# Patient Record
Sex: MALE | Race: WHITE | URBAN - METROPOLITAN AREA
[De-identification: names, ages, dates, MRNs, and addresses within clinical notes are randomized per-mention and may not be internally consistent; named-entity substitution may affect disease eponyms.]

---

## 2020-12-17 ENCOUNTER — HOSPITAL ENCOUNTER (OUTPATIENT)
Dept: PHYSICAL THERAPY | Age: 55
Discharge: HOME | End: 2020-12-17
Payer: COMMERCIAL

## 2020-12-17 PROCEDURE — 97110 THERAPEUTIC EXERCISES: CPT

## 2020-12-17 PROCEDURE — 97162 PT EVAL MOD COMPLEX 30 MIN: CPT

## 2020-12-17 PROCEDURE — 97530 THERAPEUTIC ACTIVITIES: CPT

## 2020-12-17 NOTE — PROGRESS NOTES
[facility]  [address], [address] [facility] [place]  Ph [phone]  Fx [phone]    Plan of Care/ Statement of Necessity for Physical Therapy Services    Patient name: Hamp Dance Start of Care: 2020   Referral source: Sammi Obando* : 1965    Medical Diagnosis: Spinal stenosis [M48.00]   Onset [id number]:   Treatment Diagnosis: spinal stenosis                                              ICD-10: M48.00   Prior Hospitalization: see medical history Provider#: [id number]   Medications: Verified on Patient summary List    Comorbidities: depression; bipolar disorder; tremor, migraines   Prior Level of Function: functionally independent, no AD, moderate lifestyle      The Plan of Care and following information is based on the information from the initial evaluation. Assessment/ key information: 49 yo male who [facility] to [facility] with c/o lumbar  pain. Patient is s/p spinal stenosis early in . Patient reports decreased pain since being laid off and not standing on his feet all day. Patient demonstrates decreased ROM, decreased strength, impaired posture, impaired gait mechancis, pain and decreased functional mobility tolerance.     Patient will continue to benefit from skilled PT services to modify and progress therapeutic interventions, address functional mobility deficits, address ROM deficits, address strength deficits, analyze and address soft tissue restrictions, analyze and cue movement patterns, analyze and modify body mechanics/ergonomics, assess and modify postural abnormalities, address imbalance/dizziness and instruct in home and community integration to attain remaining goals.     Evaluation Complexity History MEDIUM  Complexity : 1-2 comorbidities / personal factors will impact the outcome/ POC ; Examination MEDIUM Complexity : 3 Standardized tests and measures addressing body structure, function, activity limitation and / or participation in recreation  ;Presentation MEDIUM Complexity : Evolving with changing characteristics  ; Clinical Decision Making MEDIUM Complexity : FOTO score of 26-74 : FOTO score = an established functional score where 100 = no disability  Overall Complexity Rating: MEDIUM  Problem List: pain affecting function, decrease ROM, decrease strength, impaired gait/ balance, decrease ADL/ functional abilitiies, decrease activity tolerance and decrease flexibility/ joint mobility   Treatment Plan may include any combination of the following: Therapeutic exercise, Therapeutic activities, Neuromuscular re-education, Physical agent/modality, Gait/balance training, Manual therapy, Patient education, Self Care training, Functional mobility training and Home safety training  Patient / Family readiness to learn indicated by: asking questions  Persons(s) to be included in education: patient (P)  Barriers to Learning/Limitations: None  Measures taken if barriers to learning: N/A  Patient Goal (s): body repair  Patient Self Reported Health Status: good  Rehabilitation Potential: good    Short Term Goals: To be accomplished in 3 weeks:   Short Term Goals: To be accomplished in 3 weeks:  1. Patient will be independent and compliant with HEP to progress toward goals and restore functional mobility. Eval Status: issued at [facility]     2. Patient will improve FOTO score by 4 points to improve functional tolerance for exercise. Eval Status: FOTO 59     3. Patient will improve pain in lumbar region  to 5/10  to improve static standing with cooking tolerance and restore prior level of function. Eval Status: 10/10 at worst     4. Pt will have +4/5 core and bilateral  strength to return to goals of ambulation with community distances.   Eval Status: Strength    Left  (0-5) Right  (0-5) N/T   Hip Flexion (L1,2) -4/5 -4/5 []?    Knee Extension (L3,4) +4/5 +4/5 []?    Ankle Dorsiflexion (L4) 5/5 5/5 []?    Great Toe Extension (L5) 5/5 5/5 []?    Ankle Plantarflexion (S1) 5/5 5/5 []?    Knee Flexion (S1,2) -4/5 -4/5 []?    Abdominals 4/5 4/5 []?    Paraspinals 4/5 4/5 []?    Back Rotators     []?    Gluteus Mario -4/5 -4/5 []?    Hip Abduction -4/5 -4/5 []?          5. Pt will have pain free lumbar AROM to 100% aid in functional mechanics for ambulation/ADLs. Eval Status: Active Movements: []? N/A   []? Too acute   []? Other:  ROM % AROM Comments:pain, area   Forward flexion 40-60 100%     Extension 20-30 100%     SideBend right 20-30       SideBend left 20-30       Rotation right 5-10 75%     Rotation left 5-10 75%           6. Patient will demonstrate stable pelvic positioning during ambulation to reduce pain in lumbar region. Eval Status:long sit test (+)    Long Term Goals: To be accomplished in 6 weeks:  1. Patient will improve FOTO score by 8 points to improve functional tolerance for cooking activities. Eval Status: FOTO 59     2. Pt will have 5/5 core and bilateral LE strength to return to goals of .community ambulation  Eval Status:   Strength    Left  (0-5) Right  (0-5) N/T   Hip Flexion (L1,2) -4/5 -4/5 []?    Knee Extension (L3,4) +4/5 +4/5 []?    Ankle Dorsiflexion (L4) 5/5 5/5 []?    Great Toe Extension (L5) 5/5 5/5 []?    Ankle Plantarflexion (S1) 5/5 5/5 []?    Knee Flexion (S1,2) -4/5 -4/5 []?    Abdominals 4/5 4/5 []?    Paraspinals 4/5 4/5 []?    Back Rotators     []?    Gluteus Mario -4/5 -4/5 []?    Hip Abduction -4/5 -4/5 []?       3. Patient will improve pain in lumbar spine to 0/10 at worst to improve rolling in bed tolerance and restore prior level of function. Eval Status: 10/10 at worst    Frequency / Duration: Patient to be seen 2 times per week for 6 weeks.     Patient/ Caregiver education and instruction: Diagnosis, prognosis, self care, activity modification and exercises   [x]  Plan of care has been reviewed with PTA    Certification Period: N/A  Catherine Maza 12/17/2020 6:35 PM    ________________________________________________________________________    I certify that the above Therapy Services are being furnished while the patient is under my care. I agree with the treatment plan and certify that this therapy is necessary.     Physician's Signature:_____________________Date:____________TIME:________    [employer], Date and Time must be completed for valid certification **  Please sign and return to [facility] [place] [address] [address], [address]  Ph [phone]  Fx [phone]

## 2020-12-17 NOTE — PROGRESS NOTES
PT DAILY TREATMENT NOTE/ LUMBAR EVAL 10-18    Patient Name: Omar Hairston  Date:2020 1DOB: 1965  [x]  Patient  Verified  Payor: Guzman Mcdonough / Plan: [place] [employer] PPO / Product Type: PPO /    In time:2:47  Out time:3:34  Total Treatment Time (min): 52  Visit #: 1 of 12    Medicare/BCBS Only   Total Timed Codes (min):  20 1:1 Treatment Time:  17       Treatment Area: Spinal stenosis [M48.00]    SUBJECTIVE  Pain Level (0-10 scale): 2/10  [x]constant []intermittent [x]improving []worsening []no change since onset    Any medication changes, allergies to medications, adverse drug reactions, diagnosis change, or new procedure performed?: [x] No    [] Yes (see summary sheet for update)  Subjective functional status/changes:     PLOF: functionally independent, no AD, moderate lifestyle  Limitations to PLOF: increased with long periods of standing; difficulty with extended  Walking; reports decreased symptoms since being laid off at work in April; improvement over a 2 month period after being out of work  Mechanism of Injury: chronic issues since ; was having increasing pain in lumbar and experienced weakness in bilateral LE  Current symptoms/Complaints: 2/10 at best with rest; 10/10 at worst with walking/standing; pt reports they are able to sleep through the night secondary to pain  Previous Treatment/Compliance: no injection; MRI (+) spinal stenosis;   PMHx/Surgical Hx: depression; bipolar disorder; tremor, migraines  Work Hx: laid off currently  Living Situation: lives with wife; no steps  Pt Goals: body repair  Barriers: [x]pain []financial []time []transportation []other  Motivation: good  Substance use: []Alcohol []Tobacco []other:   Cognition: A & O x 3        OBJECTIVE      17 min []Eval                  []Re-Eval       10 min Therapeutic Exercise:  [] See flow sheet :   Rationale: increase ROM, increase strength and improve coordination to improve the patients ability to improve core stability    10 min Therapeutic Activity:  []  See flow sheet :   Rationale: increase strength, improve coordination, improve balance and increase proprioception  to improve the patients ability to understand disease process and recovery expectations             With   [x] TE   [x] TA   [] neuro   [] other: Patient Education: [x] Review HEP    [] Progressed/Changed HEP based on:   [] positioning   [] body mechanics   [] transfers   [] heat/ice application    [] other:        General Evaluation    Physical Therapy Evaluation - Lumbar Spine  :    OBJECTIVE  Posture:  Lateral Shift: [x] R    [] L     [x] +  [] -  Kyphosis: [x] Increased [] Decreased   []  WNL  Lordosis:  [x] Increased [] Decreased   [] WNL  Pelvic symmetry: [] WNL    [] Other:    Gait:  [] Normal     [] Abnormal:    Active Movements: [] N/A   [] Too acute   [] Other:  ROM % AROM Comments:pain, area   Forward flexion 40-60 100%    Extension 20-30 100%    SideBend right 20-30     SideBend left 20-30     Rotation right 5-10 75%    Rotation left 5-10 75%        Neuro Screen [] WNL  Myotome/Dermatome/Reflexes: patella right and left 3+  Comments:    Dural Mobility:  SLR Supine: [x] R    [x] L    [] +    [x] -  @ (degrees):   Slump Test: [] R    [] L    [] +    [] -  @ (degrees):   Prone Knee Bend: [] R    [] L    [] +    [] -     Palpation  [] Min  [x] Mod  [] Severe    Location:left  Thoracic and lumbar paraspinals tightness with right to left spinal curve   [] Min  [] Mod  [] Severe    Location:    Strength   Left  (0-5) Right  (0-5) N/T   Hip Flexion (L1,2) -4/5 -4/5 []   Knee Extension (L3,4) +4/5 +4/5 []   Ankle Dorsiflexion (L4) 5/5 5/5 []   Great Toe Extension (L5) 5/5 5/5 []   Ankle Plantarflexion (S1) 5/5 5/5 []   Knee Flexion (S1,2) -4/5 -4/5 []   Abdominals 4/5 4/5 []   Paraspinals 4/5 4/5 []   Back Rotators   []   Gluteus Mario -4/5 -4/5 []   Hip Abduction -4/5 -4/5 []         Special Tests  Lumbar:  Lumb.  Compression: [] Pos  [] Neg               Lumbar Distraction:   [] Pos  [x] Neg    Quadrant:  [] Pos  [] Neg   [] Flex  [] Ext    Sacroilliac:  Gaenslen's: [] R    [] L    [] +    [] -     Compression: [] +    [x] -     Gapping:  [] +    [x] -     Thigh Thrust: [] R    [] L    [] +    [] -            Hip: Barb Maclachlan:  [] R    [x] L    [x] +    [] -     Scour:  [x] R    [x] L    [] +    [x] -     Piriformis: [] R    [] L    [] +    [] -       Other Tests / Comments:   Long sit test (+)     Pain Level (0-10 scale) post treatment: 2/10    ASSESSMENT/Changes in Function: 47 yo male who [facility] to [facility] with c/o lumbar  pain. Patient is s/p spinal stenosis early in 2020. Patient reports decreased pain since being laid off and not standing on his feet all day. Patient demonstrates decreased ROM, decreased strength, impaired posture, impaired gait mechancis, pain and decreased functional mobility tolerance. Patient will continue to benefit from skilled PT services to modify and progress therapeutic interventions, address functional mobility deficits, address ROM deficits, address strength deficits, analyze and address soft tissue restrictions, analyze and cue movement patterns, analyze and modify body mechanics/ergonomics, assess and modify postural abnormalities, address imbalance/dizziness and instruct in home and community integration to attain remaining goals. [x]  See Plan of Care  []  See progress note/recertification  []  See Discharge Summary         Progress towards goals / Updated goals:  Short Term Goals: To be accomplished in 3 weeks:  1. Patient will be independent and compliant with HEP to progress toward goals and restore functional mobility. Eval Status: issued at [facility]    2. Patient will improve FOTO score by 4 points to improve functional tolerance for exercise. Eval Status: FOTO 59    3. Patient will improve pain in lumbar region  to 5/10  to improve static standing with cooking tolerance and restore prior level of function.   Eval Status: 10/10 at worst    4. Pt will have +4/5 core and bilateral  strength to return to goals of ambulation with community distances. Eval Status: Strength   Left  (0-5) Right  (0-5) N/T   Hip Flexion (L1,2) -4/5 -4/5 []   Knee Extension (L3,4) +4/5 +4/5 []   Ankle Dorsiflexion (L4) 5/5 5/5 []   Great Toe Extension (L5) 5/5 5/5 []   Ankle Plantarflexion (S1) 5/5 5/5 []   Knee Flexion (S1,2) -4/5 -4/5 []   Abdominals 4/5 4/5 []   Paraspinals 4/5 4/5 []   Back Rotators   []   Gluteus Mario -4/5 -4/5 []   Hip Abduction -4/5 -4/5 []       5. Pt will have pain free lumbar AROM to 100% aid in functional mechanics for ambulation/ADLs. Eval Status: Active Movements: [] N/A   [] Too acute   [] Other:  ROM % AROM Comments:pain, area   Forward flexion 40-60 100%    Extension 20-30 100%    SideBend right 20-30     SideBend left 20-30     Rotation right 5-10 75%    Rotation left 5-10 75%        6. Patient will demonstrate stable pelvic positioning during ambulation to reduce pain in lumbar region. Eval Status:long sit test (+)    Long Term Goals: To be accomplished in 6 weeks:  1. Patient will improve FOTO score by 8 points to improve functional tolerance for cooking activities. Eval Status: FOTO 59    2. Pt will have 5/5 core and bilateral LE strength to return to goals of .community ambulation  Eval Status:   Strength   Left  (0-5) Right  (0-5) N/T   Hip Flexion (L1,2) -4/5 -4/5 []   Knee Extension (L3,4) +4/5 +4/5 []   Ankle Dorsiflexion (L4) 5/5 5/5 []   Great Toe Extension (L5) 5/5 5/5 []   Ankle Plantarflexion (S1) 5/5 5/5 []   Knee Flexion (S1,2) -4/5 -4/5 []   Abdominals 4/5 4/5 []   Paraspinals 4/5 4/5 []   Back Rotators   []   Gluteus Mario -4/5 -4/5 []   Hip Abduction -4/5 -4/5 []     3. Patient will improve pain in lumbar spine to 0/10 at worst to improve rolling in bed tolerance and restore prior level of function.   Eval Status: 10/10 at worst    PLAN  [x]  Upgrade activities as tolerated     [x]  Continue plan of care  [x]  Update interventions per flow sheet       []  Discharge due to:_  []  Other:_      Sesar Victoria 12/17/2020  2:54 PM

## 2020-12-24 ENCOUNTER — HOSPITAL ENCOUNTER (OUTPATIENT)
Dept: PHYSICAL THERAPY | Age: 55
Discharge: HOME | End: 2020-12-24
Payer: COMMERCIAL

## 2020-12-24 PROCEDURE — 97530 THERAPEUTIC ACTIVITIES: CPT

## 2020-12-24 PROCEDURE — 97110 THERAPEUTIC EXERCISES: CPT

## 2020-12-24 PROCEDURE — 97140 MANUAL THERAPY 1/> REGIONS: CPT

## 2020-12-24 PROCEDURE — 97112 NEUROMUSCULAR REEDUCATION: CPT

## 2020-12-24 NOTE — PROGRESS NOTES
PT DAILY TREATMENT NOTE    Patient Name: Jackeline Fuentes  Date:2020  : 1965  [x]  Patient  Verified  Payor: Darrius Estevez / Plan: [place] [employer] PPO / Product Type: PPO /    In time:8:45  Out time:9:30  Total Treatment Time (min): 45  Total Timed Codes (min): 40  1:1 Treatment Time ( only): N/A   Visit #: 2 of 12    Treatment Area: Spinal stenosis [M48.00]    SUBJECTIVE  Pain Level (0-10 scale): 2/10  Any medication changes, allergies to medications, adverse drug reactions, diagnosis change, or new procedure performed?: [x] No    [] Yes (see summary sheet for update)  Subjective functional status/changes:   [] No changes reported  Reports that he had a mild sharp pain yesterday in hi tailbone region; reports is was just temporary;     OBJECTIVE    20 min Therapeutic Exercise:  []? See flow sheet :   Rationale: increase ROM, increase strength and improve coordination to improve the patients ability to improve core stability     15 min Therapeutic Activity:  []? See flow sheet :   Rationale: increase strength, improve coordination, improve balance and increase proprioception  to improve the patients ability to understand disease process and recovery expectations      10 min Manual Therapy:  Muscle energy correction   Rationale: decrease pain, increase ROM, increase tissue extensibility, correct positional vertigo, decrease trigger points and increase postural awareness to improve the patients ability to improve muscle balance The manual therapy interventions were performed at a separate and distinct time from the therapeutic activities interventions.           With   [x] TE   [x] TA   [] neuro   [] other: Patient Education: [x] Review HEP    [] Progressed/Changed HEP based on:   [] positioning   [] body mechanics   [] transfers   [] heat/ice application    [] other:      Other Objective/Functional Measures: long sit test (+)     Pain Level (0-10 scale) post treatment: 1/10    ASSESSMENT/Changes in Function: Patient has unstable pelvic positioning; no pain; reviewed self correction; no issues with progression of exercises. Patient will continue to benefit from skilled PT services to modify and progress therapeutic interventions, address functional mobility deficits, address ROM deficits, address strength deficits, analyze and address soft tissue restrictions, analyze and cue movement patterns, analyze and modify body mechanics/ergonomics, assess and modify postural abnormalities, address imbalance/dizziness and instruct in home and community integration to attain remaining goals. [x]  See Plan of Care  []  See progress note/recertification  []  See Discharge Summary         Progress towards goals / Updated goals:  Short Term Goals: To be accomplished in 3 weeks:  1. Patient will be independent and compliant with HEP to progress toward goals and restore functional mobility. Eval Status: issued at eval  Current:Reviewed HEP 12/24/2020     2. Patient will improve FOTO score by 4 points to improve functional tolerance for exercise. Eval Status: FOTO 59     3. Patient will improve pain in lumbar region  to 5/10  to improve static standing with cooking tolerance and restore prior level of function. Eval Status: 10/10 at worst  Current: 2/10 12/24/2020     4. Pt will have +4/5 core and bilateral  strength to return to goals of ambulation with community distances. Eval Status: Strength    Left  (0-5) Right  (0-5) N/T   Hip Flexion (L1,2) -4/5 -4/5 []?    Knee Extension (L3,4) +4/5 +4/5 []?    Ankle Dorsiflexion (L4) 5/5 5/5 []?    Great Toe Extension (L5) 5/5 5/5 []?    Ankle Plantarflexion (S1) 5/5 5/5 []?    Knee Flexion (S1,2) -4/5 -4/5 []?    Abdominals 4/5 4/5 []?    Paraspinals 4/5 4/5 []?    Back Rotators     []?    Gluteus Mario -4/5 -4/5 []?    Hip Abduction -4/5 -4/5 []?          5. Pt will have pain free lumbar AROM to 100% aid in functional mechanics for ambulation/ADLs.   Eval Status: Active Movements: []? N/A   []? Too acute   []? Other:  ROM % AROM Comments:pain, area   Forward flexion 40-60 100%     Extension 20-30 100%     SideBend right 20-30       SideBend left 20-30       Rotation right 5-10 75%     Rotation left 5-10 75%           6. Patient will demonstrate stable pelvic positioning during ambulation to reduce pain in lumbar region. Eval Status:long sit test (+)  Current: 12/24/2020 long sit test (+)    Long Term Goals: To be accomplished in 6 weeks:  1. Patient will improve FOTO score by 8 points to improve functional tolerance for cooking activities. Eval Status: FOTO 59     2. Pt will have 5/5 core and bilateral LE strength to return to goals of .community ambulation  Eval Status:   Strength    Left  (0-5) Right  (0-5) N/T   Hip Flexion (L1,2) -4/5 -4/5 []?    Knee Extension (L3,4) +4/5 +4/5 []?    Ankle Dorsiflexion (L4) 5/5 5/5 []?    Great Toe Extension (L5) 5/5 5/5 []?    Ankle Plantarflexion (S1) 5/5 5/5 []?    Knee Flexion (S1,2) -4/5 -4/5 []?    Abdominals 4/5 4/5 []?    Paraspinals 4/5 4/5 []?    Back Rotators     []?    Gluteus Mario -4/5 -4/5 []?    Hip Abduction -4/5 -4/5 []?       3. Patient will improve pain in lumbar spine to 0/10 at worst to improve rolling in bed tolerance and restore prior level of function. Eval Status: 10/10 at worst    PLAN  [x]  Upgrade activities as tolerated     [x]  Continue plan of care  []  Update interventions per flow sheet       []  Discharge due to:_  []  Other:_      Brandon Armas 12/24/2020  9:03 AM    No future appointments.

## 2021-01-06 ENCOUNTER — HOSPITAL ENCOUNTER (OUTPATIENT)
Dept: PHYSICAL THERAPY | Age: 56
Discharge: HOME | End: 2021-01-06
Payer: COMMERCIAL

## 2021-01-06 PROCEDURE — 97110 THERAPEUTIC EXERCISES: CPT

## 2021-01-06 PROCEDURE — 97530 THERAPEUTIC ACTIVITIES: CPT

## 2021-01-06 NOTE — PROGRESS NOTES
PT DAILY TREATMENT NOTE    Patient Name: Lizandro Diaz  Date:2021  : 1965  [x]  Patient  Verified  Payor: Charlotte Wright / Plan: Michaela Pruitt / Product Type: HMO /    In time:11:46  Out time:12:33  Total Treatment Time (min): 47  Total Timed Codes (min): 47  1:1 Treatment Time ([facility] only): NA   Visit #: 3 of 12    Treatment Area: Spinal stenosis [M48.00]    SUBJECTIVE  Pain Level (0-10 scale): 0/10  Any medication changes, allergies to medications, adverse drug reactions, diagnosis change, or new procedure performed?: [x] No    [] Yes (see summary sheet for update)  Subjective functional status/changes:   [] No changes reported  \"I don't have any pain right now. \"    OBJECTIVE      37 min Therapeutic Exercise:  [x] See flow sheet :   Rationale: increase ROM, increase strength and improve coordination to improve the patients ability to return to prior level of physical activity. 10 min Therapeutic Activity:  [x]  See flow sheet :   Rationale: increase ROM, increase strength and improve coordination  to improve the patients ability to return to prior level of physical activity. With   [] TE   [] TA   [] neuro   [] other: Patient Education: [x] Review HEP    [] Progressed/Changed HEP based on:   [] positioning   [] body mechanics   [] transfers   [] heat/ice application    [] other:      Other Objective/Functional Measures:      Pain Level (0-10 scale) post treatment: 0/10    ASSESSMENT/Changes in Function: Pt tolerated session well with no increased pain. Pt did demonstrate signs of fatigue and deconditioning in multiple muscle groups. Pt reported no pain or adverse affects post tx.      Patient will continue to benefit from skilled PT services to modify and progress therapeutic interventions, address functional mobility deficits, address ROM deficits, address strength deficits, analyze and address soft tissue restrictions, analyze and cue movement patterns, analyze and modify body mechanics/ergonomics and assess and modify postural abnormalities to attain remaining goals. []  See Plan of Care  []  See progress note/recertification  []  See Discharge Summary         Progress towards goals / Updated goals:  Short Term Goals: To be accomplished in 3 weeks:  1. Patient will be independent and compliant with HEP to progress toward goals and restore functional mobility. Eval Status: issued at eval  Current: Pt given resistance band for HEP 1/6/21     2. Patient will improve FOTO score by 4 points to improve functional tolerance for exercise. Eval Status: FOTO 59     3. Patient will improve pain in lumbar region  to 5/10  to improve static standing with cooking tolerance and restore prior level of function. Eval Status: 10/10 at worst  Current: 3/10 at worst 1/6/21     4. Pt will have +4/5 core and bilateral  strength to return to goals of ambulation with community distances. Eval Status: Strength    Left  (0-5) Right  (0-5) N/T   Hip Flexion (L1,2) -4/5 -4/5 []??    Knee Extension (L3,4) +4/5 +4/5 []??    Ankle Dorsiflexion (L4) 5/5 5/5 []??    Great Toe Extension (L5) 5/5 5/5 []??    Ankle Plantarflexion (S1) 5/5 5/5 []??    Knee Flexion (S1,2) -4/5 -4/5 []??    Abdominals 4/5 4/5 []??    Paraspinals 4/5 4/5 []??    Back Rotators     []? ?    Gluteus Mario -4/5 -4/5 []??    Hip Abduction -4/5 -4/5 []??     Current: no change since eval 1/6/21     5. Pt will have pain free lumbar AROM to 100% aid in functional mechanics for ambulation/ADLs. Eval Status: Active Movements: []?? N/A   []? ? Too acute   []? ? Other:  ROM % AROM Comments:pain, area   Forward flexion 40-60 100%     Extension 20-30 100%     SideBend right 20-30       SideBend left 20-30       Rotation right 5-10 75%     Rotation left 5-10 75%      Current: No change since eval 1/6/21     6. Patient will demonstrate stable pelvic positioning during ambulation to reduce pain in lumbar region.   Eval Status:long sit test (+)  Current: 12/24/2020 long sit test (+)     Long Term Goals: To be accomplished in 6 weeks:  1. Patient will improve FOTO score by 8 points to improve functional tolerance for cooking activities. Eval Status: FOTO 59     2.   Pt will have 5/5 core and bilateral LE strength to return to goals of .community ambulation  Eval Status:   Strength    Left  (0-5) Right  (0-5) N/T   Hip Flexion (L1,2) -4/5 -4/5 []??    Knee Extension (L3,4) +4/5 +4/5 []??    Ankle Dorsiflexion (L4) 5/5 5/5 []??    Great Toe Extension (L5) 5/5 5/5 []??    Ankle Plantarflexion (S1) 5/5 5/5 []??    Knee Flexion (S1,2) -4/5 -4/5 []??    Abdominals 4/5 4/5 []??    Paraspinals 4/5 4/5 []??    Back Rotators     []? ?    Gluteus Mario -4/5 -4/5 []??    Hip Abduction -4/5 -4/5 []??     Current: No change since eval 1/6/21  3.   Patient will improve pain in lumbar spine to 0/10 at worst to improve rolling in bed tolerance and restore prior level of function.   Eval Status: 10/10 at worst  Current: 3/10 at worst in past week 1/6/21      PLAN  [x]  Upgrade activities as tolerated     [x]  Continue plan of care  []  Update interventions per flow sheet       []  Discharge due to:_  []  Other:_      Leslie Alves PTA 1/6/2021  11:50 AM    Future Appointments   Date Time Provider Kristie Aleman   1/8/2021 11:00 AM [facility]   1/13/2021 11:45 AM Kaylin Giang PTA [facility]   1/15/2021 11:00 AM [facility]   1/20/2021 12:30 PM [facility]   1/22/2021 12:30 PM [facility]   1/27/2021 11:45 AM [facility]   1/29/2021 12:30 PM Sabra Miller [facility]

## 2021-01-06 NOTE — PROGRESS NOTES
[facility] at [address] [place], [address]  Ph [phone]  Fx [phone]    Physical Therapy Progress Note  Patient name: Sumaya Saba Start of Care: 2020   Referral source: Mariela Jordan* : 1965   Medical/Treatment Diagnosis: Spinal stenosis [M48.00] Onset Date:2019:   Prior Hospitalization: see medical history Provider#: [id number]   Medications: Verified on Patient Summary List    Comorbidities: depression; bipolar disorder; tremor, migraines  Prior Level of Function: functionally independent, no AD, moderate lifestyle    Visits from Start of Care: 3    Missed Visits: 0    Goals/Measure of Progress:    Patient will continue to benefit from skilled PT services to modify and progress therapeutic interventions, address functional mobility deficits, address ROM deficits, address strength deficits, analyze and address soft tissue restrictions, analyze and cue movement patterns, analyze and modify body mechanics/ergonomics and assess and modify postural abnormalities to attain remaining goals. Short Term Goals: To be accomplished in 3 weeks:  1. Patient will be independent and compliant with HEP to progress toward goals and restore functional mobility. Eval Status: issued at eval  Current: Pt given resistance band for HEP 21     2. Patient will improve FOTO score by 4 points to improve functional tolerance for exercise. Eval Status: FOTO 59     3. Patient will improve pain in lumbar region  to 5/10  to improve static standing with cooking tolerance and restore prior level of function. Eval Status: 10/10 at worst  Current: 3/10 at worst 21     4. Pt will have +4/5 core and bilateral  strength to return to goals of ambulation with community distances.   Eval Status: Strength    Left  (0-5) Right  (0-5) N/T   Hip Flexion (L1,2) -4/5 -4/5 []???    Knee Extension (L3,4) +4/5 +4/5 []???    Ankle Dorsiflexion (L4) 5/5 5/5 []???    Great Toe Extension (L5) 5/5 5/5 []???    Ankle Plantarflexion (S1) 5/5 5/5 []???    Knee Flexion (S1,2) -4/5 -4/5 []???    Abdominals 4/5 4/5 []???    Paraspinals 4/5 4/5 []???    Back Rotators     []? ??    Gluteus Mario -4/5 -4/5 []???    Hip Abduction -4/5 -4/5 []???     Current: no change since eval 1/6/21     5. Pt will have pain free lumbar AROM to 100% aid in functional mechanics for ambulation/ADLs. Eval Status: Active Movements: []??? N/A   []? ?? Too acute   []? ?? Other:  ROM % AROM Comments:pain, area   Forward flexion 40-60 100%     Extension 20-30 100%     SideBend right 20-30       SideBend left 20-30       Rotation right 5-10 75%     Rotation left 5-10 75%      Current: No change since eval 1/6/21     6. Patient will demonstrate stable pelvic positioning during ambulation to reduce pain in lumbar region. Eval Status:long sit test (+)  Current: 12/24/2020 long sit test (+)     Long Term Goals: To be accomplished in 6 weeks:  1. Patient will improve FOTO score by 8 points to improve functional tolerance for cooking activities. Eval Status: FOTO 59     2.   Pt will have 5/5 core and bilateral LE strength to return to goals of .community ambulation  Eval Status:   Strength    Left  (0-5) Right  (0-5) N/T   Hip Flexion (L1,2) -4/5 -4/5 []???    Knee Extension (L3,4) +4/5 +4/5 []???    Ankle Dorsiflexion (L4) 5/5 5/5 []???    Great Toe Extension (L5) 5/5 5/5 []???    Ankle Plantarflexion (S1) 5/5 5/5 []???    Knee Flexion (S1,2) -4/5 -4/5 []???    Abdominals 4/5 4/5 []???    Paraspinals 4/5 4/5 []???    Back Rotators     []? ??    Gluteus Mario -4/5 -4/5 []???    Hip Abduction -4/5 -4/5 []???     Current: No change since eval 1/6/21  3.   Patient will improve pain in lumbar spine to 0/10 at worst to improve rolling in bed tolerance and restore prior level of function.   Eval Status: 10/10 at worst  Current: 3/10 at worst in past week 1/6/21       Key Functional Changes: no significant changes since initial evaluation  Updated Goals:  To be achieved in 4weeks:    ASSESSMENT/RECOMMENDATIONS:  [x]Continue therapy per initial plan/protocol at a frequency of  2 x per week for 4 weeks  []Continue therapy with the following recommended changes:_____________________ _____________________________ ________________________________________  []Discontinue therapy progressing towards or have reached established goals  []Discontinue therapy due to lack of appreciable progress towards goals  []Discontinue therapy due to lack of attendance or compliance  []Await Physician's recommendations/decisions regarding therapy  []Other:________________________________________________________________    Thank you for this referral.   Yessica Verdugo 1/6/2021 2:53 PM

## 2021-01-08 ENCOUNTER — HOSPITAL ENCOUNTER (OUTPATIENT)
Dept: PHYSICAL THERAPY | Age: 56
Discharge: HOME | End: 2021-01-08
Payer: COMMERCIAL

## 2021-01-08 PROCEDURE — 97530 THERAPEUTIC ACTIVITIES: CPT

## 2021-01-08 PROCEDURE — 97110 THERAPEUTIC EXERCISES: CPT

## 2021-01-08 NOTE — PROGRESS NOTES
PT DAILY TREATMENT NOTE    Patient Name: Lizandro Diaz  Date:2021  : 1965  [x]  Patient  Verified  Payor: Charlotte Wright / Plan: Michaela Pruitt / Product Type: HMO /    In time:11:00  Out time:11:53  Total Treatment Time (min): 53  Total Timed Codes (min): 53  1:1 Treatment Time ( only): 48   Visit #: 4 of 12    Treatment Area: Spinal stenosis [M48.00]    SUBJECTIVE  Pain Level (0-10 scale): 0/10  Any medication changes, allergies to medications, adverse drug reactions, diagnosis change, or new procedure performed?: [x] No    [] Yes (see summary sheet for update)  Subjective functional status/changes:   [] No changes reported  \"I don't have any pain right now. \"    OBJECTIVE      38 min Therapeutic Exercise:  [x] See flow sheet :   Rationale: increase ROM, increase strength and improve coordination to improve the patients ability to return to prior level of physical activity. 15 min Therapeutic Activity:  [x]  See flow sheet :   Rationale: increase ROM, increase strength and improve coordination  to improve the patients ability to return to prior level of physical activity. With   [] TE   [] TA   [] neuro   [] other: Patient Education: [x] Review HEP    [] Progressed/Changed HEP based on:   [] positioning   [] body mechanics   [] transfers   [] heat/ice application    [] other:      Other Objective/Functional Measures:      Pain Level (0-10 scale) post treatment: 0/10    ASSESSMENT/Changes in Function: Pt tolerated session well. Pt continues to demonstrate very evident weakness in glutes and general deconditioning of muscles in LE. Pt reported no pain with ex but reported increased fatigue post tx. PTA reviewed HEP with pt to ensure pt ability to comply with recommended compliance.      Patient will continue to benefit from skilled PT services to modify and progress therapeutic interventions, address functional mobility deficits, address ROM deficits, address strength deficits, analyze and address soft tissue restrictions, analyze and cue movement patterns, analyze and modify body mechanics/ergonomics and assess and modify postural abnormalities to attain remaining goals. []  See Plan of Care  []  See progress note/recertification  []  See Discharge Summary         Progress towards goals / Updated goals:  Short Term Goals: To be accomplished in 3 weeks:  1. Patient will be independent and compliant with HEP to progress toward goals and restore functional mobility. Eval Status: issued at eval  Current: Pt given resistance band for HEP 1/6/21     2. Patient will improve FOTO score by 4 points to improve functional tolerance for exercise. Eval Status: FOTO 59     3. Patient will improve pain in lumbar region  to 5/10  to improve static standing with cooking tolerance and restore prior level of function. Eval Status: 10/10 at worst  Current: 3/10 at worst 1/6/21     4. Pt will have +4/5 core and bilateral  strength to return to goals of ambulation with community distances. Eval Status: Strength    Left  (0-5) Right  (0-5) N/T   Hip Flexion (L1,2) -4/5 -4/5 []????    Knee Extension (L3,4) +4/5 +4/5 []????    Ankle Dorsiflexion (L4) 5/5 5/5 []????    Great Toe Extension (L5) 5/5 5/5 []????    Ankle Plantarflexion (S1) 5/5 5/5 []????    Knee Flexion (S1,2) -4/5 -4/5 []????    Abdominals 4/5 4/5 []????    Paraspinals 4/5 4/5 []????    Back Rotators     []????    Gluteus Mario -4/5 -4/5 []????    Hip Abduction -4/5 -4/5 []????     Current: no change since eval 1/6/21     5. Pt will have pain free lumbar AROM to 100% aid in functional mechanics for ambulation/ADLs. Eval Status: Active Movements: []???? N/A   []???? Too acute   []???? Other:  ROM % AROM Comments:pain, area   Forward flexion 40-60 100%     Extension 20-30 100%     SideBend right 20-30       SideBend left 20-30       Rotation right 5-10 75%     Rotation left 5-10 75%      Current: No change since eval 1/6/21     6. Patient will demonstrate stable pelvic positioning during ambulation to reduce pain in lumbar region. Eval Status:long sit test (+)  Current: 12/24/2020 long sit test (+)     Long Term Goals: To be accomplished in 6 weeks:  1. Patient will improve FOTO score by 8 points to improve functional tolerance for cooking activities. Eval Status: FOTO 59     2.   Pt will have 5/5 core and bilateral LE strength to return to goals of .community ambulation  Eval Status:   Strength    Left  (0-5) Right  (0-5) N/T   Hip Flexion (L1,2) -4/5 -4/5 []????    Knee Extension (L3,4) +4/5 +4/5 []????    Ankle Dorsiflexion (L4) 5/5 5/5 []????    Great Toe Extension (L5) 5/5 5/5 []????    Ankle Plantarflexion (S1) 5/5 5/5 []????    Knee Flexion (S1,2) -4/5 -4/5 []????    Abdominals 4/5 4/5 []????    Paraspinals 4/5 4/5 []????    Back Rotators     []????    Gluteus Mario -4/5 -4/5 []????    Hip Abduction -4/5 -4/5 []????     Current: No change since eval 1/6/21  3.   Patient will improve pain in lumbar spine to 0/10 at worst to improve rolling in bed tolerance and restore prior level of function.   Eval Status: 10/10 at worst  Current: 3/10 at worst in past week 1/6/21      PLAN  [x]  Upgrade activities as tolerated     [x]  Continue plan of care  []  Update interventions per flow sheet       []  Discharge due to:_  []  Other:_      Christine Banegas PTA 1/8/2021  11:08 AM    Future Appointments   Date Time Provider Kristie Aleman   1/13/2021 11:45 AM [facility]   1/15/2021 11:00 AM [facility]   1/20/2021 12:30 PM [facility]   1/22/2021 12:30 PM [facility]   1/27/2021 11:45 AM [facility]   1/29/2021 12:30 PM [facility]

## 2021-01-13 ENCOUNTER — APPOINTMENT (OUTPATIENT)
Dept: PHYSICAL THERAPY | Age: 56
End: 2021-01-13
Payer: COMMERCIAL

## 2021-01-15 ENCOUNTER — HOSPITAL ENCOUNTER (OUTPATIENT)
Dept: PHYSICAL THERAPY | Age: 56
Discharge: HOME | End: 2021-01-15
Payer: COMMERCIAL

## 2021-01-15 PROCEDURE — 97110 THERAPEUTIC EXERCISES: CPT

## 2021-01-15 PROCEDURE — 97530 THERAPEUTIC ACTIVITIES: CPT

## 2021-01-15 NOTE — PROGRESS NOTES
PT DAILY TREATMENT NOTE    Patient Name: Sal Rene  Date:1/15/2021  : 1965  [x]  Patient  Verified  Payor: Sinai Saunders / Plan: August Blackwood / Product Type: HMO /    In time: 11:00  Out time:11:55  Total Treatment Time (min): 55  Total Timed Codes (min): 55  1:1 Treatment Time ([facility] only): 54   Visit #: 5 of 12    Treatment Area: Spinal stenosis [M48.00]    SUBJECTIVE  Pain Level (0-10 scale): 0/10  Any medication changes, allergies to medications, adverse drug reactions, diagnosis change, or new procedure performed?: [x] No    [] Yes (see summary sheet for update)  Subjective functional status/changes:   [] No changes reported  \"I don't have any pain right now. I haven't been doing my exercises. \"    OBJECTIVE    45 min Therapeutic Exercise:  [x] See flow sheet :   Rationale: increase ROM, increase strength and improve coordination to improve the patients ability to return to prior level of physical activity. 10 min Therapeutic Activity:  [x]  See flow sheet :   Rationale: increase ROM, increase strength and improve coordination  to improve the patients ability to return to prior level of physical activity. With   [] TE   [] TA   [] neuro   [] other: Patient Education: [x] Review HEP    [] Progressed/Changed HEP based on:   [] positioning   [] body mechanics   [] transfers   [] heat/ice application    [] other:      Other Objective/Functional Measures:      Pain Level (0-10 scale) post treatment: 0/10    ASSESSMENT/Changes in Function: Pt tolerated session well. Pt reports lack of HEP participation due to moderate depression and general lack of motivation. PTA provided palliative conversation and then education. Pt reported having no pain post tx.      Patient will continue to benefit from skilled PT services to modify and progress therapeutic interventions, address functional mobility deficits, address ROM deficits, address strength deficits, analyze and address soft tissue restrictions, analyze and cue movement patterns, analyze and modify body mechanics/ergonomics and assess and modify postural abnormalities to attain remaining goals. []  See Plan of Care  []  See progress note/recertification  []  See Discharge Summary         Progress towards goals / Updated goals:  Short Term Goals: To be accomplished in 3 weeks:  1. Patient will be independent and compliant with HEP to progress toward goals and restore functional mobility. Eval Status: issued at eval  Current: Pt reports 1x/day compliance 1/15/21     2. Patient will improve FOTO score by 4 points to improve functional tolerance for exercise. Eval Status: FOTO 59     3. Patient will improve pain in lumbar region  to 5/10  to improve static standing with cooking tolerance and restore prior level of function. Eval Status: 10/10 at worst  Current: 3/10 at worst 1/6/21     4. Pt will have +4/5 core and bilateral  strength to return to goals of ambulation with community distances. Eval Status: Strength    Left  (0-5) Right  (0-5) N/T   Hip Flexion (L1,2) -4/5 -4/5 []?????    Knee Extension (L3,4) +4/5 +4/5 []?????    Ankle Dorsiflexion (L4) 5/5 5/5 []?????    Great Toe Extension (L5) 5/5 5/5 []?????    Ankle Plantarflexion (S1) 5/5 5/5 []?????    Knee Flexion (S1,2) -4/5 -4/5 []?????    Abdominals 4/5 4/5 []?????    Paraspinals 4/5 4/5 []?????    Back Rotators     []?????    Gluteus Mario -4/5 -4/5 []?????    Hip Abduction -4/5 -4/5 []?????     Current: no change since eval 1/6/21     5. Pt will have pain free lumbar AROM to 100% aid in functional mechanics for ambulation/ADLs. Eval Status: Active Movements: []????? N/A   []????? Too acute   []????? Other:  ROM % AROM Comments:pain, area   Forward flexion 40-60 100%     Extension 20-30 100%     SideBend right 20-30       SideBend left 20-30       Rotation right 5-10 75%     Rotation left 5-10 75%      Current: No change since eval 1/6/21     6. Patient will demonstrate stable pelvic positioning during ambulation to reduce pain in lumbar region.  Eval Status:long sit test (+)  Current: 12/24/2020 long sit test (+)     Long Term Goals: To be accomplished in 6 weeks:  1. Patient will improve FOTO score by 8 points to improve functional tolerance for cooking activities.  Eval Status: FOTO 59     2.   Pt will have 5/5 core and bilateral LE strength to return to goals of .community ambulation  Eval Status:   Strength    Left  (0-5) Right  (0-5) N/T   Hip Flexion (L1,2) -4/5 -4/5 []?????    Knee Extension (L3,4) +4/5 +4/5 []?????    Ankle Dorsiflexion (L4) 5/5 5/5 []?????    Great Toe Extension (L5) 5/5 5/5 []?????    Ankle Plantarflexion (S1) 5/5 5/5 []?????    Knee Flexion (S1,2) -4/5 -4/5 []?????    Abdominals 4/5 4/5 []?????    Paraspinals 4/5 4/5 []?????    Back Rotators     []?????    Gluteus Mario -4/5 -4/5 []?????    Hip Abduction -4/5 -4/5 []?????     Current: No change since eval 1/6/21  3.   Patient will improve pain in lumbar spine to 0/10 at worst to improve rolling in bed tolerance and restore prior level of function.  Eval Status: 10/10 at worst  Current: 3/10 at worst in past week 1/6/21         PLAN  [x]  Upgrade activities as tolerated     [x]  Continue plan of care  []  Update interventions per flow sheet       []  Discharge due to:_  []  Other:_      Grey Agee PTA 1/15/2021  11:01 AM    Future Appointments   Date Time Provider Department Center   1/20/2021 12:30 PM Grey Agee PTA [facility]   1/22/2021 12:30 PM Grey Agee PTA [facility]   1/27/2021 11:45 AM Grey Agee PTA [facility]   1/29/2021 12:30 PM Grey Agee PTA [facility]

## 2021-01-20 ENCOUNTER — HOSPITAL ENCOUNTER (OUTPATIENT)
Dept: PHYSICAL THERAPY | Age: 56
Discharge: HOME | End: 2021-01-20
Payer: COMMERCIAL

## 2021-01-20 PROCEDURE — 97110 THERAPEUTIC EXERCISES: CPT

## 2021-01-20 NOTE — PROGRESS NOTES
PT DAILY TREATMENT NOTE    Patient Name: Dawson Tinoco  Date:2021  : 1965  [x]  Patient  Verified  Payor: Luis Daniel Cruz / Plan: Antoine Gross / Product Type: HMO /    In time:12:28  Out time:1:15  Total Treatment Time (min): 47   Total Timed Codes (min): 47  1:1 Treatment Time ( [address] only): 52   Visit #: 6 of 12    Treatment Area: Spinal stenosis [M48.00]    SUBJECTIVE  Pain Level (0-10 scale): 0/10  Any medication changes, allergies to medications, adverse drug reactions, diagnosis change, or new procedure performed?: [x] No    [] Yes (see summary sheet for update)  Subjective functional status/changes:   [] No changes reported  \"I don't have any pain right now. Just a little tired after doing some cleaning. \"    OBJECTIVE    47 min Therapeutic Exercise:  [x] See flow sheet :   Rationale: increase ROM, increase strength and improve coordination to improve the patients ability to return to prior level of physical activity. With   [] TE   [] TA   [] neuro   [] other: Patient Education: [x] Review HEP    [] Progressed/Changed HEP based on:   [] positioning   [] body mechanics   [] transfers   [] heat/ice application    [] other:      Other Objective/Functional Measures:      Pain Level (0-10 scale) post treatment: 0/10    ASSESSMENT/Changes in Function: Pt tolerated session well with no increased pain. Pt continues to demonstrate greatly increased fatigue with ex but not above tolerance. Pt performed TRX to promote glute usage and proper weight shift posteriorly for sitting    Patient will continue to benefit from skilled PT services to modify and progress therapeutic interventions, address functional mobility deficits, address ROM deficits, address strength deficits, analyze and address soft tissue restrictions, analyze and cue movement patterns, analyze and modify body mechanics/ergonomics and assess and modify postural abnormalities to attain remaining goals.      []  See Plan of Care  []  See progress note/recertification  []  See Discharge Summary         Progress towards goals / Updated goals:  Short Term Goals: To be accomplished in 3 weeks:  1. Patient will be independent and compliant with HEP to progress toward goals and restore functional mobility. Eval Status: issued at eval  Current: Pt reports every other day compliance 1/20/21     2. Patient will improve FOTO score by 4 points to improve functional tolerance for exercise. Eval Status: FOTO 59     3. Patient will improve pain in lumbar region  to 5/10  to improve static standing with cooking tolerance and restore prior level of function. Eval Status: 10/10 at worst  Current: 3/10 at worst 1/6/21     4. Pt will have +4/5 core and bilateral  strength to return to goals of ambulation with community distances. Eval Status: Strength    Left  (0-5) Right  (0-5) N/T   Hip Flexion (L1,2) -4/5 -4/5 []??????    Knee Extension (L3,4) +4/5 +4/5 []??????    Ankle Dorsiflexion (L4) 5/5 5/5 []??????    Great Toe Extension (L5) 5/5 5/5 []??????    Ankle Plantarflexion (S1) 5/5 5/5 []??????    Knee Flexion (S1,2) -4/5 -4/5 []??????    Abdominals 4/5 4/5 []??????    Paraspinals 4/5 4/5 []??????    Back Rotators     []??????    Gluteus Mario -4/5 -4/5 []??????    Hip Abduction -4/5 -4/5 []??????     Current: no change since eval 1/6/21     5. Pt will have pain free lumbar AROM to 100% aid in functional mechanics for ambulation/ADLs. Eval Status: Active Movements: []?????? N/A   []?????? Too acute   []?????? Other:  ROM % AROM Comments:pain, area   Forward flexion 40-60 100%     Extension 20-30 100%     SideBend right 20-30       SideBend left 20-30       Rotation right 5-10 75%     Rotation left 5-10 75%      Current: No change since eval 1/6/21     6. Patient will demonstrate stable pelvic positioning during ambulation to reduce pain in lumbar region.   Eval Status:long sit test (+)  Current: 12/24/2020 long sit test (+)     Long Term Goals: To be accomplished in 6 weeks:  1. Patient will improve FOTO score by 8 points to improve functional tolerance for cooking activities. Eval Status: FOTO 59     2.   Pt will have 5/5 core and bilateral LE strength to return to goals of .community ambulation  Eval Status:   Strength    Left  (0-5) Right  (0-5) N/T   Hip Flexion (L1,2) -4/5 -4/5 []??????    Knee Extension (L3,4) +4/5 +4/5 []??????    Ankle Dorsiflexion (L4) 5/5 5/5 []??????    Great Toe Extension (L5) 5/5 5/5 []??????    Ankle Plantarflexion (S1) 5/5 5/5 []??????    Knee Flexion (S1,2) -4/5 -4/5 []??????    Abdominals 4/5 4/5 []??????    Paraspinals 4/5 4/5 []??????    Back Rotators     []??????    Gluteus Mario -4/5 -4/5 []??????    Hip Abduction -4/5 -4/5 []??????     Current: No change since eval 1/6/21  3.   Patient will improve pain in lumbar spine to 0/10 at worst to improve rolling in bed tolerance and restore prior level of function.   Eval Status: 10/10 at worst  Current: 3/10 at worst in past week 1/6/21      PLAN  [x]  Upgrade activities as tolerated     [x]  Continue plan of care  []  Update interventions per flow sheet       []  Discharge due to:_  []  Other:_      Keenan Whitten, CHARITO 1/20/2021  12:40 PM    Future Appointments   Date Time Provider Kristie Aleman   1/22/2021 12:30 PM [facility]   1/27/2021 11:45 AM Virl [facility]   1/29/2021 12:30 PM [facility]

## 2021-01-22 ENCOUNTER — HOSPITAL ENCOUNTER (OUTPATIENT)
Dept: PHYSICAL THERAPY | Age: 56
End: 2021-01-22
Payer: COMMERCIAL

## 2021-01-27 ENCOUNTER — HOSPITAL ENCOUNTER (OUTPATIENT)
Dept: PHYSICAL THERAPY | Age: 56
Discharge: HOME | End: 2021-01-27
Payer: COMMERCIAL

## 2021-01-27 PROCEDURE — 97110 THERAPEUTIC EXERCISES: CPT

## 2021-01-27 NOTE — PROGRESS NOTES
PT DAILY TREATMENT NOTE    Patient Name: Rudi Jeffers  Date:2021  : 1965  [x]  Patient  Verified  Payor: Evan Found / Plan: Demi Cooler / Product Type: HMO /    In time:8:02  Out time:8:58  Total Treatment Time (min): 56  Total Timed Codes (min): 51  1:1 Treatment Time ([facility] only): N/A   Visit #: 7 of 12    Treatment Area: Spinal stenosis [M48.00]    SUBJECTIVE  Pain Level (0-10 scale): 0/10  Any medication changes, allergies to medications, adverse drug reactions, diagnosis change, or new procedure performed?: [x] No    [] Yes (see summary sheet for update)  Subjective functional status/changes:   [] No changes reported  Follow up with physician is the second week of February; reports that his legs buckled out from under him earlier this week. OBJECTIVE    56 min Therapeutic Exercise:  [x]? See flow sheet :   Rationale: increase ROM, increase strength and improve coordination to improve the patients ability to return to prior level of physical activity.              With   [x] TE   [x] TA   [] neuro   [] other: Patient Education: [x] Review HEP    [] Progressed/Changed HEP based on:   [] positioning   [] body mechanics   [] transfers   [] heat/ice application    [] other:      Other Objective/Functional Measures: long sit test (-)    Pain Level (0-10 scale) post treatment: 0/10    ASSESSMENT/Changes in Function: Patient has stable pelvic positioning; no pain; independent with self correction; no issues with progression of exercises.     Patient will continue to benefit from skilled PT services to modify and progress therapeutic interventions, address functional mobility deficits, address ROM deficits, address strength deficits, analyze and address soft tissue restrictions, analyze and cue movement patterns, analyze and modify body mechanics/ergonomics, assess and modify postural abnormalities, address imbalance/dizziness and instruct in home and community integration to attain remaining goals. [x]  See Plan of Care  []  See progress note/recertification  []  See Discharge Summary         Progress towards goals / Updated goals:  Short Term Goals: To be accomplished in 3 weeks:  1. Patient will be independent and compliant with HEP to progress toward goals and restore functional mobility. Eval Status: issued at eval  Current: Pt reports every other day compliance 1/27/21     2. Patient will improve FOTO score by 4 points to improve functional tolerance for exercise. Eval Status: FOTO 59     3. Patient will improve pain in lumbar region  to 5/10  to improve static standing with cooking tolerance and restore prior level of function. Eval Status: 10/10 at worst  Current: 0/10 at worst 1/27/21     4. Pt will have +4/5 core and bilateral  strength to return to goals of ambulation with community distances. Eval Status: Strength    Left  (0-5) Right  (0-5) N/T   Hip Flexion (L1,2) -4/5 -4/5 []???????    Knee Extension (L3,4) +4/5 +4/5 []???????    Ankle Dorsiflexion (L4) 5/5 5/5 []???????    Great Toe Extension (L5) 5/5 5/5 []???????    Ankle Plantarflexion (S1) 5/5 5/5 []???????    Knee Flexion (S1,2) -4/5 -4/5 []???????    Abdominals 4/5 4/5 []???????    Paraspinals 4/5 4/5 []???????    Back Rotators     []???????    Gluteus Mario -4/5 -4/5 []???????    Hip Abduction -4/5 -4/5 []???????     Current: no change since eval 1/6/21     5. Pt will have pain free lumbar AROM to 100% aid in functional mechanics for ambulation/ADLs. Eval Status: Active Movements: []??????? N/A   []??????? Too acute   []??????? Other:  ROM % AROM Comments:pain, area   Forward flexion 40-60 100%     Extension 20-30 100%     SideBend right 20-30       SideBend left 20-30       Rotation right 5-10 75%     Rotation left 5-10 75%      Current: No change since eval 1/6/21     6. Patient will demonstrate stable pelvic positioning during ambulation to reduce pain in lumbar region.   Eval Status:long sit test (+)  Current: 01/27/2021 long sit test (-)     Long Term Goals: To be accomplished in 6 weeks:  1. Patient will improve FOTO score by 8 points to improve functional tolerance for cooking activities. Eval Status: FOTO 59     2.   Pt will have 5/5 core and bilateral LE strength to return to goals of .community ambulation  Eval Status:   Strength    Left  (0-5) Right  (0-5) N/T   Hip Flexion (L1,2) -4/5 -4/5 []???????    Knee Extension (L3,4) +4/5 +4/5 []???????    Ankle Dorsiflexion (L4) 5/5 5/5 []???????    Great Toe Extension (L5) 5/5 5/5 []???????    Ankle Plantarflexion (S1) 5/5 5/5 []???????    Knee Flexion (S1,2) -4/5 -4/5 []???????    Abdominals 4/5 4/5 []???????    Paraspinals 4/5 4/5 []???????    Back Rotators     []???????    Gluteus Mario -4/5 -4/5 []???????    Hip Abduction -4/5 -4/5 []???????     Current: No change since eval 1/6/21  3.   Patient will improve pain in lumbar spine to 0/10 at worst to improve rolling in bed tolerance and restore prior level of function.   Eval Status: 10/10 at worst  Current: 0/10 01/27/21    PLAN  [x]  Upgrade activities as tolerated     [x]  Continue plan of care  []  Update interventions per flow sheet       []  Discharge due to:_  []  Other:_      Abelardo Lovett 1/27/2021  8:11 AM    Future Appointments   Date Time Provider Kristie Aleman   1/29/2021 12:30 PM Bhavani Pool [facility]

## 2021-01-29 ENCOUNTER — HOSPITAL ENCOUNTER (OUTPATIENT)
Dept: PHYSICAL THERAPY | Age: 56
Discharge: HOME | End: 2021-01-29
Payer: COMMERCIAL

## 2021-01-29 PROCEDURE — 97110 THERAPEUTIC EXERCISES: CPT

## 2021-01-29 NOTE — PROGRESS NOTES
PT DAILY TREATMENT NOTE    Patient Name: Grecia Garduno  Date:2021  : 1965  [x]  Patient  Verified  Payor: Jacob Mendez / Plan: Zeyad Park / Product Type: HMO /    In time:12:31  Out time:1:26  Total Treatment Time (min): 55  Total Timed Codes (min): 55  1:1 Treatment Time ( [address] only): 54   Visit #: 8 of 11    Treatment Area: Spinal stenosis [M48.00]    SUBJECTIVE  Pain Level (0-10 scale): 0/10  Any medication changes, allergies to medications, adverse drug reactions, diagnosis change, or new procedure performed?: [x] No    [] Yes (see summary sheet for update)  Subjective functional status/changes:   [] No changes reported  Pt reports he moved furniture yesterday and he felt the sensation in his spine, it wasn't pain, but he can't describe it. Pt reports the sensation goes away about 30 minutes after sitting down. Pt reports he gets that sensation when he is washing dishes. 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 Pt reports his  Appointment is on . OBJECTIVE      53 min Therapeutic Exercise:  [] See flow sheet :   Rationale: increase ROM, increase strength, improve coordination and increase proprioception to improve the patients ability to increase patient's ease with performing functional activities and decrease overall pain and symptoms. With   [x] TE   [] TA   [] neuro   [] other: Patient Education: [x] Review HEP    [] Progressed/Changed HEP based on:   [] positioning   [] body mechanics   [] transfers   [] heat/ice application    [] other:      Other Objective/Functional Measures: hip flexion: strength: L: 4/5, R: 5/5 21        Pain Level (0-10 scale) post treatment: 0/10    ASSESSMENT/Changes in Function: Patient tolerated treatment well with no adverse reactions today. Pt required verbal and visual cuing for correct form with exercises.  Pt is progressing with therapy as indicated by pt tolerating increase in exercise repetitions and resistance. Pt is progressing with hip strength. Pt required verbal and visual cuing during hip abduction to keep toes forward to increase hip abduction and decrease hip flexor compensation. Although showing progress patient would benefit from continuation of skilled physical therapy to address the remaining limitations. Patient will continue to benefit from skilled PT services to modify and progress therapeutic interventions, address functional mobility deficits, address ROM deficits, address strength deficits, analyze and address soft tissue restrictions, analyze and cue movement patterns, analyze and modify body mechanics/ergonomics and assess and modify postural abnormalities to attain remaining goals. []  See Plan of Care  []  See progress note/recertification  []  See Discharge Summary         Progress towards goals / Updated goals:  Short Term Goals: To be accomplished in 3 weeks:  1. Patient will be independent and compliant with HEP to progress toward goals and restore functional mobility. Eval Status: issued at [facility]  Current: Pt reports every other day compliance 1/27/21     2. Patient will improve FOTO score by 4 points to improve functional tolerance for exercise. Eval Status: FOTO 59     3. Patient will improve pain in lumbar region  to 5/10  to improve static standing with cooking tolerance and restore prior level of function. Eval Status: 10/10 at worst  Current: 0/10 at worst 1/27/21     4. Pt will have +4/5 core and bilateral  strength to return to goals of ambulation with community distances.   Eval Status: Strength    Left  (0-5) Right  (0-5) N/T   Hip Flexion (L1,2) -4/5 -4/5 []????????    Knee Extension (L3,4) +4/5 +4/5 []????????    Ankle Dorsiflexion (L4) 5/5 5/5 []????????    Great Toe Extension (L5) 5/5 5/5 []????????    Ankle Plantarflexion (S1) 5/5 5/5 []????????    Knee Flexion (S1,2) -4/5 -4/5 []????????    Abdominals 4/5 4/5 []????????    Paraspinals 4/5 4/5 []????????    Back Rotators     []????????    Gluteus Mario -4/5 -4/5 []????????    Hip Abduction -4/5 -4/5 []????????     Current: hip flexion L: 4/5, R: 5/5 1/29/21 progressing        5. Pt will have pain free lumbar AROM to 100% aid in functional mechanics for ambulation/ADLs. Eval Status: Active Movements: []???????? N/A   []???????? Too acute   []???????? Other:  ROM % AROM Comments:pain, area   Forward flexion 40-60 100%     Extension 20-30 100%     SideBend right 20-30       SideBend left 20-30       Rotation right 5-10 75%     Rotation left 5-10 75%      Current: No change since eval 1/6/21     6. Patient will demonstrate stable pelvic positioning during ambulation to reduce pain in lumbar region. Eval Status:long sit test (+)  Current: 01/27/2021 long sit test (-)     Long Term Goals: To be accomplished in 6 weeks:  1. Patient will improve FOTO score by 8 points to improve functional tolerance for cooking activities. Eval Status: FOTO 59     2.   Pt will have 5/5 core and bilateral LE strength to return to goals of .community ambulation  Eval Status:   Strength    Left  (0-5) Right  (0-5) N/T   Hip Flexion (L1,2) -4/5 -4/5 []????????    Knee Extension (L3,4) +4/5 +4/5 []????????    Ankle Dorsiflexion (L4) 5/5 5/5 []????????    Great Toe Extension (L5) 5/5 5/5 []????????    Ankle Plantarflexion (S1) 5/5 5/5 []????????    Knee Flexion (S1,2) -4/5 -4/5 []????????    Abdominals 4/5 4/5 []????????    Paraspinals 4/5 4/5 []????????    Back Rotators     []????????    Gluteus Mario -4/5 -4/5 []????????    Hip Abduction -4/5 -4/5 []????????     Current: No change since eval 1/6/21  3.   Patient will improve pain in lumbar spine to 0/10 at worst to improve rolling in bed tolerance and restore prior level of function.   Eval Status: 10/10 at worst  Current: 0/10 01/27/21      PLAN  [x]  Upgrade activities as tolerated     [x]  Continue plan of care  []  Update interventions per flow sheet       []  Discharge due to:_  [x]  Other:_  Add dead lifts next weekend.     [place], [place] 1/29/2021  12:38 PM    Future Appointments   Date Time Provider Kristie Aleman   2/2/2021  8:45 AM Chintan Bangura, PT [facility]   2/4/2021 11:45 AM Guy Neal [facility]   2/9/2021  8:45 AM Dolores Gilliam [facility]   2/11/2021  8:45 AM Manuel Todd PTA [facility]

## 2021-02-02 ENCOUNTER — APPOINTMENT (OUTPATIENT)
Dept: PHYSICAL THERAPY | Age: 56
End: 2021-02-02
Payer: COMMERCIAL

## 2021-02-03 ENCOUNTER — HOSPITAL ENCOUNTER (OUTPATIENT)
Dept: PHYSICAL THERAPY | Age: 56
Discharge: HOME | End: 2021-02-03
Payer: COMMERCIAL

## 2021-02-03 PROCEDURE — 97530 THERAPEUTIC ACTIVITIES: CPT

## 2021-02-03 PROCEDURE — 97110 THERAPEUTIC EXERCISES: CPT

## 2021-02-03 NOTE — PROGRESS NOTES
[facility]  [address]  [facility], [address]  Ph [phone]  Fx [phone]    Physical Therapy Progress Note  Patient name: Lela Maharaj Start of Care: 2020   Referral source: Na Wallace* : 1965   Medical/Treatment Diagnosis: Spinal stenosis [M48.00] Onset Date:   Prior Hospitalization: see medical history Provider#: [id number]   Medications: Verified on Patient Summary List    Comorbidities: depression; bipolar disorder; tremor, migraines  Prior Level of Function:functionally independent, no AD, moderate lifestyle    Visits from Start of Care: 9    Missed Visits: 2    Patient will continue to benefit from skilled PT services to modify and progress therapeutic interventions, address functional mobility deficits, address ROM deficits, address strength deficits, analyze and address soft tissue restrictions, analyze and cue movement patterns, analyze and modify body mechanics/ergonomics and assess and modify postural abnormalities to attain remaining goals. Goals/Measure of Progress:    Short Term Goals: To be accomplished in 3 weeks:  1. Patient will be independent and compliant with HEP to progress toward goals and restore functional mobility. Eval Status: issued at eval  Current: Pt reports 1x/week 2/3/21     2. Patient will improve FOTO score by 4 points to improve functional tolerance for exercise. Eval Status: FOTO 59  Current: FOTO 60 2/3/21     3. Patient will improve pain in lumbar region  to 5/10  to improve static standing with cooking tolerance and restore prior level of function. Eval Status: 10/10 at worst  Current: 0/10 at worst 2/3/21     4.  Pt will have +4/5 core and bilateral  strength to return to goals of ambulation with community distances.      Eval Status: Strength    Left  (0-5) Right  (0-5) Left RIght   Hip Flexion (L1,2) -4/5 -4/5 4+/5 4+/5   Knee Extension (L3,4) +4/5 +4/5 5/5 5/5   Ankle Dorsiflexion (L4) 5/5 5/5 5/5 5/5   Great Toe Extension (L5) 5/5 5/5 5/5 5/5   Ankle Plantarflexion (S1) 5/5 5/5 5/5 5/5   Knee Flexion (S1,2) -4/5 -4/5 4+/5 4+/5   Abdominals 4/5 4/5 5/5     Paraspinals 4/5 4/5 5/5     Back Rotators           Gluteus Mario -4/5 -4/5 4/5     Hip Abduction -4/5 -4/5 4/5      Current: see above 2/3/21     5. Pt will have pain free lumbar AROM to 100% aid in functional mechanics for ambulation/ADLs. Eval Status: Active Movements: []?????????? N/A   []?????????? Too acute   []?????????? Other:  ROM % AROM Comments:pain, area   Forward flexion 40-60 100%     Extension 20-30 100%     SideBend right 20-30       SideBend left 20-30       Rotation right 5-10 75%     Rotation left 5-10 75%      Current: Within functional limitsl 2/3/21     6. Patient will demonstrate stable pelvic positioning during ambulation to reduce pain in lumbar region. Eval Status:long sit test (+)  Current:  long sit test (-) 2/3/21     Long Term Goals: To be accomplished in 6 weeks:  1. Patient will improve FOTO score by 8 points to improve functional tolerance for cooking activities. Eval Status: FOTO 59  Current: FOTO 60 2/3/21     2.   Pt will have 5/5 core and bilateral LE strength to return to goals of .community ambulation  Eval Status: Strength    Left  (0-5) Right  (0-5) Left RIght   Hip Flexion (L1,2) -4/5 -4/5 4+/5 4+/5   Knee Extension (L3,4) +4/5 +4/5 5/5 5/5   Ankle Dorsiflexion (L4) 5/5 5/5 5/5 5/5   Great Toe Extension (L5) 5/5 5/5 5/5 5/5   Ankle Plantarflexion (S1) 5/5 5/5 5/5 5/5   Knee Flexion (S1,2) -4/5 -4/5 4+/5 4+/5   Abdominals 4/5 4/5 5/5     Paraspinals 4/5 4/5 5/5     Back Rotators           Gluteus Mario -4/5 -4/5 4/5     Hip Abduction -4/5 -4/5 4/5      Current: see above 2/3/21     3.   Patient will improve pain in lumbar spine to 0/10 at worst to improve rolling in bed tolerance and restore prior level of function.   Eval Status: 10/10 at worst  Current: 0/10 pain at worst 2/3/21     Key Functional Changes: improved tolerance to standing. Reduced pain in B LE's  Pt  improvement with general strength with daily activities; mild strength deficits in Bilateral glutes. Pt would benefit from continued therapy to address continued strength deficits and general squatting down performance for object retrieval.   Updated Goals:  To be achieved in 3 weeks:    Patient will be able to squat down to  objects from floor without increased lower back pain to be able to perform  job require  Current: unable    Patient will be able to perform 30 minutes of standing activities without increased lower back pain in order to cook a meal.  Current: 15 -20 minutes      ASSESSMENT/RECOMMENDATIONS:  [x]Continue therapy per initial plan/protocol at a frequency of  2 x per week for 3 weeks  []Continue therapy with the following recommended changes:_____________________ _____________________________ ________________________________________  []Discontinue therapy progressing towards or have reached established goals  []Discontinue therapy due to lack of appreciable progress towards goals  []Discontinue therapy due to lack of attendance or compliance  []Await Physician's recommendations/decisions regarding therapy  []Other:________________________________________________________________    Thank you for this referral.   Marek Moore 2/3/2021 4:35 PM

## 2021-02-03 NOTE — PROGRESS NOTES
PT DAILY TREATMENT NOTE    Patient Name: Lucina Velazquez  Date:2/3/2021  : 1965  [x]  Patient  Verified  Payor: Aguila Mario / Plan: Jordyn Quale / Product Type: HMO /    In time:2:03  Out time:2:48  Total Treatment Time (min): 45  Total Timed Codes (min): 45  1:1 Treatment Time ( [address] only): 42   Visit #: 9 of 11    Treatment Area: Spinal stenosis [M48.00]    SUBJECTIVE  Pain Level (0-10 scale): 0/10  Any medication changes, allergies to medications, adverse drug reactions, diagnosis change, or new procedure performed?: [x] No    [] Yes (see summary sheet for update)  Subjective functional status/changes:   [] No changes reported  \"I don't have any pain. \"    OBJECTIVE      30 min Therapeutic Exercise:  [x] See flow sheet :   Rationale: increase ROM, increase strength and improve coordination to improve the patients ability to return to prior level of physical activity. 15 min Therapeutic Activity:  [x]  See flow sheet :   Rationale: increase ROM, increase strength and improve coordination  to improve the patients ability to return to prior level of physical activity. With   [] TE   [] TA   [] neuro   [] other: Patient Education: [x] Review HEP    [] Progressed/Changed HEP based on:   [] positioning   [] body mechanics   [] transfers   [] heat/ice application    [] other:      Other Objective/Functional Measures:      Pain Level (0-10 scale) post treatment: 0/10    ASSESSMENT/Changes in Function: Pt reports overall improvement since initial visit. Pt reports improved tolerance to standing. Pt reports tingling in Bilateral hands off and on and in Left posterior thigh. Pt reports previously having pain in B LE's but improved in past months. Pt reports improvement with general strength with daily activities but continues to demonstrated mild strength deficits in Bilateral glutes.  Pt would benefit from continued therapy to address continued strength deficits and general squatting down performance for object retrieval.     Patient will continue to benefit from skilled PT services to modify and progress therapeutic interventions, address functional mobility deficits, address ROM deficits, address strength deficits, analyze and address soft tissue restrictions, analyze and cue movement patterns, analyze and modify body mechanics/ergonomics and assess and modify postural abnormalities to attain remaining goals. []  See Plan of Care  []  See progress note/recertification  []  See Discharge Summary         Progress towards goals / Updated goals:  Short Term Goals: To be accomplished in 3 weeks:  1. Patient will be independent and compliant with HEP to progress toward goals and restore functional mobility. Eval Status: issued at eval  Current: Pt reports 1x/week 2/3/21     2. Patient will improve FOTO score by 4 points to improve functional tolerance for exercise. Eval Status: FOTO 59  Current: FOTO 60 2/3/21     3. Patient will improve pain in lumbar region  to 5/10  to improve static standing with cooking tolerance and restore prior level of function. Eval Status: 10/10 at worst  Current: 0/10 at worst 2/3/21     4. Pt will have +4/5 core and bilateral  strength to return to goals of ambulation with community distances.      Eval Status: Strength    Left  (0-5) Right  (0-5) Left RIght   Hip Flexion (L1,2) -4/5 -4/5 4+/5 4+/5   Knee Extension (L3,4) +4/5 +4/5 5/5 5/5   Ankle Dorsiflexion (L4) 5/5 5/5 5/5 5/5   Great Toe Extension (L5) 5/5 5/5 5/5 5/5   Ankle Plantarflexion (S1) 5/5 5/5 5/5 5/5   Knee Flexion (S1,2) -4/5 -4/5 4+/5 4+/5   Abdominals 4/5 4/5 5/5    Paraspinals 4/5 4/5 5/5    Back Rotators         Gluteus Mario -4/5 -4/5 4/5    Hip Abduction -4/5 -4/5 4/5     Current: see above 2/3/21     5. Pt will have pain free lumbar AROM to 100% aid in functional mechanics for ambulation/ADLs.   Eval Status: Active Movements: []????????? N/A   []????????? Too acute   []????????? Other:  ROM % AROM Comments:pain, area   Forward flexion 40-60 100%     Extension 20-30 100%     SideBend right 20-30       SideBend left 20-30       Rotation right 5-10 75%     Rotation left 5-10 75%      Current: Within functional limitsl 2/3/21     6. Patient will demonstrate stable pelvic positioning during ambulation to reduce pain in lumbar region. Eval Status:long sit test (+)  Current:  long sit test (-) 2/3/21     Long Term Goals: To be accomplished in 6 weeks:  1. Patient will improve FOTO score by 8 points to improve functional tolerance for cooking activities. Eval Status: FOTO 59  Current: FOTO 60 2/3/21     2.   Pt will have 5/5 core and bilateral LE strength to return to goals of .community ambulation  Eval Status: Strength    Left  (0-5) Right  (0-5) Left RIght   Hip Flexion (L1,2) -4/5 -4/5 4+/5 4+/5   Knee Extension (L3,4) +4/5 +4/5 5/5 5/5   Ankle Dorsiflexion (L4) 5/5 5/5 5/5 5/5   Great Toe Extension (L5) 5/5 5/5 5/5 5/5   Ankle Plantarflexion (S1) 5/5 5/5 5/5 5/5   Knee Flexion (S1,2) -4/5 -4/5 4+/5 4+/5   Abdominals 4/5 4/5 5/5    Paraspinals 4/5 4/5 5/5    Back Rotators         Gluteus Mario -4/5 -4/5 4/5    Hip Abduction -4/5 -4/5 4/5     Current: see above 2/3/21    3.   Patient will improve pain in lumbar spine to 0/10 at worst to improve rolling in bed tolerance and restore prior level of function.   Eval Status: 10/10 at worst  Current: 0/10 pain at worst 2/3/21         PLAN  [x]  Upgrade activities as tolerated     [x]  Continue plan of care  []  Update interventions per flow sheet       []  Discharge due to:_  []  Other:_      Joe Moon, CHARITO 2/3/2021  2:04 PM    Future Appointments   Date Time Provider Kristie Aleman   2/4/2021 11:45 AM [facility]   2/9/2021  8:45 AM Nelson Barreto [facility] THE [facility]   2/11/2021  8:45 AM Bailey Queen PTA [facility] THE [facility]

## 2021-02-04 ENCOUNTER — APPOINTMENT (OUTPATIENT)
Dept: PHYSICAL THERAPY | Age: 56
End: 2021-02-04
Payer: COMMERCIAL

## 2021-02-05 ENCOUNTER — HOSPITAL ENCOUNTER (OUTPATIENT)
Dept: PHYSICAL THERAPY | Age: 56
End: 2021-02-05
Payer: COMMERCIAL

## 2021-02-08 ENCOUNTER — APPOINTMENT (OUTPATIENT)
Dept: PHYSICAL THERAPY | Age: 56
End: 2021-02-08
Payer: COMMERCIAL

## 2021-02-09 ENCOUNTER — HOSPITAL ENCOUNTER (OUTPATIENT)
Dept: PHYSICAL THERAPY | Age: 56
Discharge: HOME | End: 2021-02-09
Payer: COMMERCIAL

## 2021-02-09 PROCEDURE — 97110 THERAPEUTIC EXERCISES: CPT

## 2021-02-09 PROCEDURE — 97530 THERAPEUTIC ACTIVITIES: CPT

## 2021-02-09 NOTE — PROGRESS NOTES
PT DAILY TREATMENT NOTE    Patient Name: Roselyn Medina  Date:2021  : 1965  [x]  Patient  Verified  Payor: Zahraa Irwin / Plan: Ann-Marie Chavez / Product Type: HMO /    In time:8:54  Out time:9:45  Total Treatment Time (min): 49  Total Timed Codes (min): 45  1:1 Treatment Time ([address] only): 45   Visit #: 10 of 15    Treatment Area: Spinal stenosis [M48.00]    SUBJECTIVE  Pain Level (0-10 scale): 0/10  Any medication changes, allergies to medications, adverse drug reactions, diagnosis change, or new procedure performed?: [x] No    [] Yes (see summary sheet for update)  Subjective functional status/changes:   [] No changes reported  \"I don't have any pain right now. \"    OBJECTIVE      39 min Therapeutic Exercise:  [x] See flow sheet :   Rationale: increase ROM, increase strength and improve coordination to improve the patients ability to return to prior level of physical activity. 10 min Therapeutic Activity:  [x]  See flow sheet :   Rationale: increase ROM, increase strength and improve coordination  to improve the patients ability to return to prior level of physical activity. With   [] TE   [] TA   [] neuro   [] other: Patient Education: [x] Review HEP    [] Progressed/Changed HEP based on:   [] positioning   [] body mechanics   [] transfers   [] heat/ice application    [] other:      Other Objective/Functional Measures:      Pain Level (0-10 scale) post treatment: 0/10    ASSESSMENT/Changes in Function: Pt tolerated session well. Pt reports mild difficulty with therex but no increased pain. Pt reported increased fatigue in glutes post tx. Pt encouraged to continue to increase HEP performance from 2x/week to 3-4x/week.      Patient will continue to benefit from skilled PT services to modify and progress therapeutic interventions, address functional mobility deficits, address ROM deficits, address strength deficits, analyze and address soft tissue restrictions, analyze and cue movement patterns, analyze and modify body mechanics/ergonomics and assess and modify postural abnormalities to attain remaining goals. []  See Plan of Care  []  See progress note/recertification  []  See Discharge Summary         Progress towards goals / Updated goals:  Short Term Goals: To be accomplished in 3 weeks:  1. Patient will be independent and compliant with HEP to progress toward goals and restore functional mobility. Eval Status: issued at eval  Last PN: Pt reports 1x/week 2/3/21  Current: Pt reports 2x/week compliance 2/9/21    2. Patient will improve FOTO score by 4 points to improve functional tolerance for exercise. Eval Status: FOTO 59  Last PN: Beth Peraltaleonard 2/3/21  Current: NA     3. Patient will improve pain in lumbar region  to 5/10  to improve static standing with cooking tolerance and restore prior level of function. Eval Status: 10/10 at worst  Last PN: 0/10 at worst 2/3/21  Current: NA     4. Pt will have +4/5 core and bilateral  strength to return to goals of ambulation with community distances.      Eval Status: Strength    Left  (0-5) Right  (0-5) Left RIght   Hip Flexion (L1,2) -4/5 -4/5 4+/5 4+/5   Knee Extension (L3,4) +4/5 +4/5 5/5 5/5   Ankle Dorsiflexion (L4) 5/5 5/5 5/5 5/5   Great Toe Extension (L5) 5/5 5/5 5/5 5/5   Ankle Plantarflexion (S1) 5/5 5/5 5/5 5/5   Knee Flexion (S1,2) -4/5 -4/5 4+/5 4+/5   Abdominals 4/5 4/5 5/5     Paraspinals 4/5 4/5 5/5     Back Rotators           Gluteus Mario -4/5 -4/5 4/5     Hip Abduction -4/5 -4/5 4/5     Last PN: see above 2/3/21  Current: NA     5. Pt will have pain free lumbar AROM to 100% aid in functional mechanics for ambulation/ADLs.   Eval Status: Active Movements: []??????????? N/A   []??????????? Too acute   []??????????? Other:  ROM % AROM Comments:pain, area   Forward flexion 40-60 100%     Extension 20-30 100%     SideBend right 20-30       SideBend left 20-30       Rotation right 5-10 75%     Rotation left 5-10 75%      Last PN: Within functional limitsl 2/3/21   Current: NA    6. Patient will demonstrate stable pelvic positioning during ambulation to reduce pain in lumbar region. Eval Status:long sit test (+)  Current:  long sit test (-) 2/3/21     Long Term Goals: To be accomplished in 6 weeks:  1. Patient will improve FOTO score by 8 points to improve functional tolerance for cooking activities. Eval Status: FOTO 59  Last PN: FOTO 60 2/3/21  Current: NA     2.   Pt will have 5/5 core and bilateral LE strength to return to goals of .community ambulation  Eval Status: Strength    Left  (0-5) Right  (0-5) Left RIght   Hip Flexion (L1,2) -4/5 -4/5 4+/5 4+/5   Knee Extension (L3,4) +4/5 +4/5 5/5 5/5   Ankle Dorsiflexion (L4) 5/5 5/5 5/5 5/5   Great Toe Extension (L5) 5/5 5/5 5/5 5/5   Ankle Plantarflexion (S1) 5/5 5/5 5/5 5/5   Knee Flexion (S1,2) -4/5 -4/5 4+/5 4+/5   Abdominals 4/5 4/5 5/5     Paraspinals 4/5 4/5 5/5     Back Rotators           Gluteus Mario -4/5 -4/5 4/5     Hip Abduction -4/5 -4/5 4/5      Last PN: see above 2/3/21   Current: NA    3.   Patient will improve pain in lumbar spine to 0/10 at worst to improve rolling in bed tolerance and restore prior level of function.   Eval Status: 10/10 at worst  Last PN: 0/10 pain at worst 2/3/21      PLAN  [x]  Upgrade activities as tolerated     [x]  Continue plan of care  []  Update interventions per flow sheet       []  Discharge due to:_  []  Other:_      Colon Squibb, PTA 2/9/2021  8:54 AM    Future Appointments   Date Time Provider Kristie Aleman   2/11/2021  8:45 AM [facility] THE [facility]   2/15/2021  1:15 PM [facility]   2/18/2021  8:00 AM Frankie Roman PTA [facility]

## 2021-02-11 ENCOUNTER — HOSPITAL ENCOUNTER (OUTPATIENT)
Dept: PHYSICAL THERAPY | Age: 56
Discharge: HOME | End: 2021-02-11
Payer: COMMERCIAL

## 2021-02-11 PROCEDURE — 97530 THERAPEUTIC ACTIVITIES: CPT

## 2021-02-11 PROCEDURE — 97110 THERAPEUTIC EXERCISES: CPT

## 2021-02-11 NOTE — PROGRESS NOTES
PT DAILY TREATMENT NOTE    Patient Name: Itz Perez  Date:2021  : 1965  [x]  Patient  Verified  Payor: BLUE CROSS / Plan: [employer] / Product Type: HMO /    In time:8:53  Out time:9:40  Total Treatment Time (min): 47  Total Timed Codes (min): 47  1:1 Treatment Time ( only): 38   Visit #: 11 of 15    Treatment Area: Spinal stenosis [M48.00]    SUBJECTIVE  Pain Level (0-10 scale): 0/10  Any medication changes, allergies to medications, adverse drug reactions, diagnosis change, or new procedure performed?: [x] No    [] Yes (see summary sheet for update)  Subjective functional status/changes:   [] No changes reported  \"I don't have any pain right now. I didn't do any exercises since the last visit.\"    OBJECTIVE      37 min Therapeutic Exercise:  [x] See flow sheet :   Rationale: increase ROM, increase strength and improve coordination to improve the patient’s ability to return to prior level of physical activity.     10 min Therapeutic Activity:  [x]  See flow sheet :   Rationale: increase ROM, increase strength and improve coordination  to improve the patient’s ability to return to prior level of physical activity.            With   [] TE   [] TA   [] neuro   [] other: Patient Education: [x] Review HEP    [] Progressed/Changed HEP based on:   [] positioning   [] body mechanics   [] transfers   [] heat/ice application    [] other:      Other Objective/Functional Measures:      Pain Level (0-10 scale) post treatment: 0/10    ASSESSMENT/Changes in Function: Pt tolerated session well. Pt continues to report increased fatigue with ex but no increased pain. Pt reports continued minimal participation with HEP. Pt encouraged to participate with HEP to ensure maximum benefit from therapy.     Patient will continue to benefit from skilled PT services to modify and progress therapeutic interventions, address functional mobility deficits, address ROM deficits, address strength deficits, analyze and  address soft tissue restrictions, analyze and cue movement patterns, analyze and modify body mechanics/ergonomics and assess and modify postural abnormalities to attain remaining goals. []  See Plan of Care  []  See progress note/recertification  []  See Discharge Summary         Progress towards goals / Updated goals:  Short Term Goals: To be accomplished in 3 weeks:  1. Patient will be independent and compliant with HEP to progress toward goals and restore functional mobility. Eval Status: issued at eval  Last PN: Pt reports 1x/week 2/3/21  Current: Pt reports 2x/week compliance 2/9/21     2. Patient will improve FOTO score by 4 points to improve functional tolerance for exercise. Eval Status: FOTO 59  Last PN: Tom Nguyễn 2/3/21  Current: NA     3. Patient will improve pain in lumbar region  to 5/10  to improve static standing with cooking tolerance and restore prior level of function. Eval Status: 10/10 at worst  Last PN: 0/10 at worst 2/3/21  Current: NA     4. Pt will have +4/5 core and bilateral  strength to return to goals of ambulation with community distances.      Eval Status: Strength    Left  (0-5) Right  (0-5) Left RIght   Hip Flexion (L1,2) -4/5 -4/5 4+/5 4+/5   Knee Extension (L3,4) +4/5 +4/5 5/5 5/5   Ankle Dorsiflexion (L4) 5/5 5/5 5/5 5/5   Great Toe Extension (L5) 5/5 5/5 5/5 5/5   Ankle Plantarflexion (S1) 5/5 5/5 5/5 5/5   Knee Flexion (S1,2) -4/5 -4/5 4+/5 4+/5   Abdominals 4/5 4/5 5/5     Paraspinals 4/5 4/5 5/5     Back Rotators           Gluteus Mario -4/5 -4/5 4/5     Hip Abduction -4/5 -4/5 4/5     Last PN: see above 2/3/21  Current: NA     5. Pt will have pain free lumbar AROM to 100% aid in functional mechanics for ambulation/ADLs.   Eval Status: Active Movements: []???????????? N/A   []???????????? Too acute   []???????????? Other:  ROM % AROM Comments:pain, area   Forward flexion 40-60 100%     Extension 20-30 100%     SideBend right 20-30       SideBend left 20-30     Rotation right 5-10 75%     Rotation left 5-10 75%      Last PN: Within functional limitsl 2/3/21   Current: NA     6.Patient will demonstrate stable pelvic positioning during ambulation to reduce pain in lumbar region. Eval Status:long sit test (+)  Current:  long sit test (-) 2/3/21     Long Term Goals: To be accomplished in 6 weeks:  1. Patient will improve FOTO score by 8 points to improve functional tolerance for cooking activities. Eval Status: FOTO 59  Last PN: FOTO 60 2/3/21  Current: NA     2.   Pt will have 5/5 core and bilateral LE strength to return to goals of .community ambulation  Eval Status: Strength    Left  (0-5) Right  (0-5) Left RIght   Hip Flexion (L1,2) -4/5 -4/5 4+/5 4+/5   Knee Extension (L3,4) +4/5 +4/5 5/5 5/5   Ankle Dorsiflexion (L4) 5/5 5/5 5/5 5/5   Great Toe Extension (L5) 5/5 5/5 5/5 5/5   Ankle Plantarflexion (S1) 5/5 5/5 5/5 5/5   Knee Flexion (S1,2) -4/5 -4/5 4+/5 4+/5   Abdominals 4/5 4/5 5/5     Paraspinals 4/5 4/5 5/5     Back Rotators           Gluteus Mario -4/5 -4/5 4/5     Hip Abduction -4/5 -4/5 4/5      Last PN: see above 2/3/21   Current: NA     3.   Patient will improve pain in lumbar spine to 0/10 at worst to improve rolling in bed tolerance and restore prior level of function.   Eval Status: 10/10 at worst  Last PN: 0/10 pain at worst 2/3/21            PLAN  [x]  Upgrade activities as tolerated     [x]  Continue plan of care  []  Update interventions per flow sheet       []  Discharge due to:_  []  Other:_      Manuel Mueller PTA 2/11/2021  8:59 AM    Future Appointments   Date Time Provider Kristie Aleman   2/15/2021  1:15 PM Barrington Toscano [facility] THE [facility]   2/18/2021  8:00 AM Constantine Castillo PTA [facility]

## 2021-02-15 ENCOUNTER — HOSPITAL ENCOUNTER (OUTPATIENT)
Dept: PHYSICAL THERAPY | Age: 56
Discharge: HOME | End: 2021-02-15
Payer: COMMERCIAL

## 2021-02-15 PROCEDURE — 97530 THERAPEUTIC ACTIVITIES: CPT

## 2021-02-15 PROCEDURE — 97110 THERAPEUTIC EXERCISES: CPT

## 2021-02-18 ENCOUNTER — HOSPITAL ENCOUNTER (OUTPATIENT)
Dept: PHYSICAL THERAPY | Age: 56
Discharge: HOME | End: 2021-02-18
Payer: COMMERCIAL

## 2021-02-18 PROCEDURE — 97530 THERAPEUTIC ACTIVITIES: CPT

## 2021-02-18 PROCEDURE — 97110 THERAPEUTIC EXERCISES: CPT

## 2021-02-18 NOTE — PROGRESS NOTES
Isma Freed PT DAILY TREATMENT NOTE    Patient Name: Sumaya Learn  Date:2021  : 1965  [x]  Patient  Verified  Payor: Ptaricia Rangel / Plan: Ngozi Ramirez / Product Type: HMO /    In time:10:55  Out time:11:49  Total Treatment Time (min): 54  Total Timed Codes (min): 54  1:1 Treatment Time ([address] only): 47   Visit #: 13 of 15    Treatment Area: Spinal stenosis [M48.00]    SUBJECTIVE  Pain Level (0-10 scale): 0/10  Any medication changes, allergies to medications, adverse drug reactions, diagnosis change, or new procedure performed?: [x] No    [] Yes (see summary sheet for update)  Subjective functional status/changes:   [] No changes reported  \"I don't have any pain. I haven't really had much pain in awhile. \"    OBJECTIVE      39 min Therapeutic Exercise:  [x] See flow sheet :   Rationale: increase ROM, increase strength and improve coordination to improve the patients ability to return to prior level of physical activity. 15 min Therapeutic Activity:  [x]  See flow sheet :   Rationale: increase ROM, increase strength and improve coordination  to improve the patients ability to return to prior level of physical activity. With   [] TE   [] TA   [] neuro   [] other: Patient Education: [x] Review HEP    [] Progressed/Changed HEP based on:   [] positioning   [] body mechanics   [] transfers   [] heat/ice application    [] other:      Other Objective/Functional Measures:      Pain Level (0-10 scale) post treatment: 0/10    ASSESSMENT/Changes in Function: Pt tolerated session well with no increased pain. Pt has made good progress since initial eval with general pain and tolerance to activity. Pt has met or is making good progress towards all goals at this time. Pt is able to maintain HEP performance well enough to continue to improve general strength for return to daily activities. Due to progress made and improvements pt is ready for D/C at this time. Pt is D/C to HEP this visit.      Patient will continue to benefit from skilled PT services to modify and progress therapeutic interventions, address functional mobility deficits, address ROM deficits, address strength deficits, analyze and address soft tissue restrictions, analyze and cue movement patterns, analyze and modify body mechanics/ergonomics and assess and modify postural abnormalities to attain remaining goals. []  See Plan of Care  []  See progress note/recertification  []  See Discharge Summary         Progress towards goals / Updated goals:  Short Term Goals: To be accomplished in 3 weeks:  1. Patient will be independent and compliant with HEP to progress toward goals and restore functional mobility. Eval Status: issued at eval  Last PN: Pt reports 1x/week 2/3/21  Current: Pt reports not compliant with HEP 2/15/21     2. Patient will improve FOTO score by 4 points to improve functional tolerance for exercise. Eval Status: FOTO 59  Last PN: Wade Tobias 2/3/21  Current: FOTO 74 2/18/21     3. Patient will improve pain in lumbar region  to 5/10  to improve static standing with cooking tolerance and restore prior level of function. Eval Status: 10/10 at worst  Last PN: 0/10 at worst 2/3/21  Current: 0/10 at worst in past week 2/18/21     4. Pt will have +4/5 core and bilateral  strength to return to goals of ambulation with community distances.      Eval Status: Strength    Left  (0-5) Right  (0-5) Left RIght   Hip Flexion (L1,2) -4/5 -4/5 4+/5 4+/5   Knee Extension (L3,4) +4/5 +4/5 5/5 5/5   Ankle Dorsiflexion (L4) 5/5 5/5 5/5 5/5   Great Toe Extension (L5) 5/5 5/5 5/5 5/5   Ankle Plantarflexion (S1) 5/5 5/5 5/5 5/5   Knee Flexion (S1,2) -4/5 -4/5 4+/5 4+/5   Abdominals 4/5 4/5 5/5     Paraspinals 4/5 4/5 5/5     Back Rotators           Gluteus Mario -4/5 -4/5 4/5     Hip Abduction -4/5 -4/5 4/5     Last PN: see above 2/3/21  Current: NA     5. Pt will have pain free lumbar AROM to 100% aid in functional mechanics for ambulation/ADLs.   Eval Status: Active Movements: []?????????????? N/A   []?????????????? Too acute   []?????????????? Other:  ROM % AROM Comments:pain, area   Forward flexion 40-60 100%     Extension 20-30 100%     SideBend right 20-30       SideBend left 20-30       Rotation right 5-10 75%     Rotation left 5-10 75%      Last PN: Within functional limitsl 2/3/21   Current: Met 2/18/21     6. Patient will demonstrate stable pelvic positioning during ambulation to reduce pain in lumbar region. Eval Status:long sit test (+)  Current:  long sit test (-) 2/3/21 Met     Long Term Goals: To be accomplished in 6 weeks:  1. Patient will improve FOTO score by 8 points to improve functional tolerance for cooking activities. Eval Status: FOTO 59  Last PN: FOTO 60 2/3/21  Current: FOTO 74 2/18/21 Met     2.   Pt will have 5/5 core and bilateral LE strength to return to goals of .community ambulation  Eval Status: Strength    Left  (0-5) Right  (0-5) Left RIght   Hip Flexion (L1,2) -4/5 -4/5 4+/5 4+/5   Knee Extension (L3,4) +4/5 +4/5 5/5 5/5   Ankle Dorsiflexion (L4) 5/5 5/5 5/5 5/5   Great Toe Extension (L5) 5/5 5/5 5/5 5/5   Ankle Plantarflexion (S1) 5/5 5/5 5/5 5/5   Knee Flexion (S1,2) -4/5 -4/5 4+/5 4+/5   Abdominals 4/5 4/5 5/5     Paraspinals 4/5 4/5 5/5     Back Rotators           Gluteus Mario -4/5 -4/5 4/5     Hip Abduction -4/5 -4/5 4/5      Last PN: see above 2/3/21   Current: NA     3.   Patient will improve pain in lumbar spine to 0/10 at worst to improve rolling in bed tolerance and restore prior level of function. Eval Status: 10/10 at worst  Last PN: 0/10 pain at worst 2/3/21 Met      PLAN  [x]  Upgrade activities as tolerated     []  Continue plan of care  []  Update interventions per flow sheet       [x]  Discharge due to: Goals Met/good progress made   []  Other:_      Keenan Whitten, CHARITO 2/18/2021  11:05 AM    No future appointments.

## 2021-08-24 NOTE — PROGRESS NOTES
[facility]  [address] [place], [address]  Ph [phone]  Fx [phone]    Physical Therapy Discharge Summary    Patient name: David Morgan Start of Care: 2020   Referral source: Shira Morales* : 1965   Medical/Treatment Diagnosis: Spinal stenosis [M48.00] Onset Date:   Prior Hospitalization: see medical history Provider#: [id number]   Medications: Verified on Patient Summary List     Comorbidities: depression; bipolar disorder; tremor, migraines  Prior Level of Function:functionally independent, no AD, moderate lifestyle     Visits from Start of Care: 13    Missed Visits: 3    Reporting Period : 20 to 21    Goals/Measure of Progress:  Progress towards goals / Updated goals:  Short Term Goals: To be accomplished in 3 weeks:  1. Patient will be independent and compliant with HEP to progress toward goals and restore functional mobility. Eval Status: issued at eval  Last PN: Pt reports 1x/week 2/3/21  Current: Pt reports not compliant with HEP 2/15/21     2. Patient will improve FOTO score by 4 points to improve functional tolerance for exercise. Eval Status: FOTO 59  Last PN: Ap Pisano 2/3/21  Current: FOTO 74 21     3. Patient will improve pain in lumbar region  to 5/10  to improve static standing with cooking tolerance and restore prior level of function. Eval Status: 10/10 at worst  Last PN: 0/10 at worst 2/3/21  Current: 0/10 at worst in past week 21     4.  Pt will have +4/5 core and bilateral  strength to return to goals of ambulation with community distances.      Eval Status: Strength    Left  (0-5) Right  (0-5) Left RIght   Hip Flexion (L1,2) -4/5 -4/5 4+/5 4+/5   Knee Extension (L3,4) +4/5 +4/5 5/5 5/5   Ankle Dorsiflexion (L4) 5/5 5/5 5/5 5/5   Great Toe Extension (L5) 5/5 5/5 5/5 5/5   Ankle Plantarflexion (S1) 5/5 5/5 5/5 5/5   Knee Flexion (S1,2) -4/5 -4/5 4+/5 4+/5   Abdominals 4/5 4/5 5/5     Paraspinals 4/5 4/5 5/5     Back Rotators           Gluteus Mario -4/5 -4/5 4/5     Hip Abduction -4/5 -4/5 4/5     Last PN: see above 2/3/21  Current: NA     5. Pt will have pain free lumbar AROM to 100% aid in functional mechanics for ambulation/ADLs. Eval Status: Active Movements: []??????????????? N/A   []??????????????? Too acute   []??????????????? Other:  ROM % AROM Comments:pain, area   Forward flexion 40-60 100%     Extension 20-30 100%     SideBend right 20-30       SideBend left 20-30       Rotation right 5-10 75%     Rotation left 5-10 75%      Last PN: Within functional limitsl 2/3/21   Current: Met 2/18/21     6. Patient will demonstrate stable pelvic positioning during ambulation to reduce pain in lumbar region. Eval Status:long sit test (+)  Current:  long sit test (-) 2/3/21 Met     Long Term Goals: To be accomplished in 6 weeks:  1. Patient will improve FOTO score by 8 points to improve functional tolerance for cooking activities. Eval Status: FOTO 59  Last PN: FOTO 60 2/3/21  Current: FOTO 74 2/18/21 Met     2.   Pt will have 5/5 core and bilateral LE strength to return to goals of .community ambulation  Eval Status: Strength    Left  (0-5) Right  (0-5) Left RIght   Hip Flexion (L1,2) -4/5 -4/5 4+/5 4+/5   Knee Extension (L3,4) +4/5 +4/5 5/5 5/5   Ankle Dorsiflexion (L4) 5/5 5/5 5/5 5/5   Great Toe Extension (L5) 5/5 5/5 5/5 5/5   Ankle Plantarflexion (S1) 5/5 5/5 5/5 5/5   Knee Flexion (S1,2) -4/5 -4/5 4+/5 4+/5   Abdominals 4/5 4/5 5/5     Paraspinals 4/5 4/5 5/5     Back Rotators           Gluteus Mario -4/5 -4/5 4/5     Hip Abduction -4/5 -4/5 4/5      Last PN: see above 2/3/21   Current: NA     3.   Patient will improve pain in lumbar spine to 0/10 at worst to improve rolling in bed tolerance and restore prior level of function. Eval Status: 10/10 at worst  Last PN: 0/10 pain at worst 2/3/21 Met      Assessment/ Summary of Care: Pt tolerated session well with no increased pain.  Pt has made good progress since initial eval with general pain and tolerance to activity. Pt has met or is making good progress towards all goals at this time. Pt is able to maintain HEP performance well enough to continue to improve general strength for return to daily activities. Due to progress made and improvements pt is ready for.     RECOMMENDATIONS:  [x]Discontinue therapy: [x]Patient has reached or is progressing toward set goals      []Patient is non-compliant or has abdicated      []Due to lack of appreciable progress towards set goals    Camila Cho, PT 8/24/2021 3:02 PM